# Patient Record
Sex: FEMALE | Race: BLACK OR AFRICAN AMERICAN | NOT HISPANIC OR LATINO | URBAN - METROPOLITAN AREA
[De-identification: names, ages, dates, MRNs, and addresses within clinical notes are randomized per-mention and may not be internally consistent; named-entity substitution may affect disease eponyms.]

---

## 2018-01-01 ENCOUNTER — EMERGENCY (EMERGENCY)
Facility: HOSPITAL | Age: 58
LOS: 1 days | Discharge: HOME | End: 2018-01-01
Attending: EMERGENCY MEDICINE | Admitting: EMERGENCY MEDICINE
Payer: COMMERCIAL

## 2018-01-01 VITALS — DIASTOLIC BLOOD PRESSURE: 67 MMHG | SYSTOLIC BLOOD PRESSURE: 106 MMHG

## 2018-01-01 VITALS
TEMPERATURE: 100 F | OXYGEN SATURATION: 98 % | HEART RATE: 82 BPM | DIASTOLIC BLOOD PRESSURE: 73 MMHG | SYSTOLIC BLOOD PRESSURE: 107 MMHG | RESPIRATION RATE: 16 BRPM

## 2018-01-01 LAB
ALBUMIN SERPL ELPH-MCNC: 3.4 G/DL — SIGNIFICANT CHANGE UP (ref 3.3–5)
ALP SERPL-CCNC: 51 U/L — SIGNIFICANT CHANGE UP (ref 40–120)
ALT FLD-CCNC: 58 U/L — HIGH (ref 4–33)
APPEARANCE UR: SIGNIFICANT CHANGE UP
AST SERPL-CCNC: 65 U/L — HIGH (ref 4–32)
BASE EXCESS BLDV CALC-SCNC: -1.6 MMOL/L — SIGNIFICANT CHANGE UP
BASOPHILS # BLD AUTO: 0.02 K/UL — SIGNIFICANT CHANGE UP (ref 0–0.2)
BASOPHILS NFR BLD AUTO: 0.3 % — SIGNIFICANT CHANGE UP (ref 0–2)
BILIRUB SERPL-MCNC: < 0.2 MG/DL — LOW (ref 0.2–1.2)
BILIRUB UR-MCNC: NEGATIVE — SIGNIFICANT CHANGE UP
BLOOD GAS VENOUS - CREATININE: 0.93 MG/DL — SIGNIFICANT CHANGE UP (ref 0.5–1.3)
BLOOD UR QL VISUAL: NEGATIVE — SIGNIFICANT CHANGE UP
BUN SERPL-MCNC: 12 MG/DL — SIGNIFICANT CHANGE UP (ref 7–23)
CALCIUM SERPL-MCNC: 8.2 MG/DL — LOW (ref 8.4–10.5)
CHLORIDE BLDV-SCNC: 106 MMOL/L — SIGNIFICANT CHANGE UP (ref 96–108)
CHLORIDE SERPL-SCNC: 103 MMOL/L — SIGNIFICANT CHANGE UP (ref 98–107)
CO2 SERPL-SCNC: 19 MMOL/L — LOW (ref 22–31)
COLOR SPEC: YELLOW — SIGNIFICANT CHANGE UP
CREAT SERPL-MCNC: 0.9 MG/DL — SIGNIFICANT CHANGE UP (ref 0.5–1.3)
EOSINOPHIL # BLD AUTO: 0.02 K/UL — SIGNIFICANT CHANGE UP (ref 0–0.5)
EOSINOPHIL NFR BLD AUTO: 0.3 % — SIGNIFICANT CHANGE UP (ref 0–6)
GAS PNL BLDV: 135 MMOL/L — LOW (ref 136–146)
GLUCOSE BLDV-MCNC: 142 — HIGH (ref 70–99)
GLUCOSE SERPL-MCNC: 133 MG/DL — HIGH (ref 70–99)
GLUCOSE UR-MCNC: NEGATIVE — SIGNIFICANT CHANGE UP
HCG SERPL-ACNC: < 5 MIU/ML — SIGNIFICANT CHANGE UP
HCO3 BLDV-SCNC: 23 MMOL/L — SIGNIFICANT CHANGE UP (ref 20–27)
HCT VFR BLD CALC: 42.2 % — SIGNIFICANT CHANGE UP (ref 34.5–45)
HCT VFR BLDV CALC: 42.9 % — SIGNIFICANT CHANGE UP (ref 34.5–45)
HGB BLD-MCNC: 14.2 G/DL — SIGNIFICANT CHANGE UP (ref 11.5–15.5)
HGB BLDV-MCNC: 14 G/DL — SIGNIFICANT CHANGE UP (ref 11.5–15.5)
HYALINE CASTS # UR AUTO: SIGNIFICANT CHANGE UP (ref 0–?)
IMM GRANULOCYTES # BLD AUTO: 0.02 # — SIGNIFICANT CHANGE UP
IMM GRANULOCYTES NFR BLD AUTO: 0.3 % — SIGNIFICANT CHANGE UP (ref 0–1.5)
KETONES UR-MCNC: NEGATIVE — SIGNIFICANT CHANGE UP
LACTATE BLDV-MCNC: 1.1 MMOL/L — SIGNIFICANT CHANGE UP (ref 0.5–2)
LEUKOCYTE ESTERASE UR-ACNC: NEGATIVE — SIGNIFICANT CHANGE UP
LYMPHOCYTES # BLD AUTO: 1.51 K/UL — SIGNIFICANT CHANGE UP (ref 1–3.3)
LYMPHOCYTES # BLD AUTO: 24.7 % — SIGNIFICANT CHANGE UP (ref 13–44)
MCHC RBC-ENTMCNC: 30.4 PG — SIGNIFICANT CHANGE UP (ref 27–34)
MCHC RBC-ENTMCNC: 33.6 % — SIGNIFICANT CHANGE UP (ref 32–36)
MCV RBC AUTO: 90.4 FL — SIGNIFICANT CHANGE UP (ref 80–100)
MONOCYTES # BLD AUTO: 0.69 K/UL — SIGNIFICANT CHANGE UP (ref 0–0.9)
MONOCYTES NFR BLD AUTO: 11.3 % — SIGNIFICANT CHANGE UP (ref 2–14)
MUCOUS THREADS # UR AUTO: SIGNIFICANT CHANGE UP
NEUTROPHILS # BLD AUTO: 3.86 K/UL — SIGNIFICANT CHANGE UP (ref 1.8–7.4)
NEUTROPHILS NFR BLD AUTO: 63.1 % — SIGNIFICANT CHANGE UP (ref 43–77)
NITRITE UR-MCNC: NEGATIVE — SIGNIFICANT CHANGE UP
NRBC # FLD: 0 — SIGNIFICANT CHANGE UP
PCO2 BLDV: 40 MMHG — LOW (ref 41–51)
PH BLDV: 7.38 PH — SIGNIFICANT CHANGE UP (ref 7.32–7.43)
PH UR: 6 — SIGNIFICANT CHANGE UP (ref 4.6–8)
PLATELET # BLD AUTO: 173 K/UL — SIGNIFICANT CHANGE UP (ref 150–400)
PMV BLD: 10.8 FL — SIGNIFICANT CHANGE UP (ref 7–13)
PO2 BLDV: 72 MMHG — HIGH (ref 35–40)
POTASSIUM BLDV-SCNC: 3.9 MMOL/L — SIGNIFICANT CHANGE UP (ref 3.4–4.5)
POTASSIUM SERPL-MCNC: 4.2 MMOL/L — SIGNIFICANT CHANGE UP (ref 3.5–5.3)
POTASSIUM SERPL-SCNC: 4.2 MMOL/L — SIGNIFICANT CHANGE UP (ref 3.5–5.3)
PROT SERPL-MCNC: 6.8 G/DL — SIGNIFICANT CHANGE UP (ref 6–8.3)
PROT UR-MCNC: 30 MG/DL — HIGH
RBC # BLD: 4.67 M/UL — SIGNIFICANT CHANGE UP (ref 3.8–5.2)
RBC # FLD: 13.4 % — SIGNIFICANT CHANGE UP (ref 10.3–14.5)
RBC CASTS # UR COMP ASSIST: SIGNIFICANT CHANGE UP (ref 0–?)
SAO2 % BLDV: 94.4 % — HIGH (ref 60–85)
SODIUM SERPL-SCNC: 136 MMOL/L — SIGNIFICANT CHANGE UP (ref 135–145)
SP GR SPEC: 1.03 — SIGNIFICANT CHANGE UP (ref 1–1.04)
SQUAMOUS # UR AUTO: SIGNIFICANT CHANGE UP
UROBILINOGEN FLD QL: NORMAL MG/DL — SIGNIFICANT CHANGE UP
WBC # BLD: 6.12 K/UL — SIGNIFICANT CHANGE UP (ref 3.8–10.5)
WBC # FLD AUTO: 6.12 K/UL — SIGNIFICANT CHANGE UP (ref 3.8–10.5)
WBC CLUMPS #/AREA URNS HPF: PRESENT — HIGH (ref 0–?)
WBC UR QL: SIGNIFICANT CHANGE UP (ref 0–?)

## 2018-01-01 PROCEDURE — 71046 X-RAY EXAM CHEST 2 VIEWS: CPT | Mod: 26

## 2018-01-01 PROCEDURE — 93010 ELECTROCARDIOGRAM REPORT: CPT

## 2018-01-01 PROCEDURE — 99285 EMERGENCY DEPT VISIT HI MDM: CPT | Mod: 25

## 2018-01-01 RX ORDER — KETOROLAC TROMETHAMINE 30 MG/ML
15 SYRINGE (ML) INJECTION ONCE
Qty: 0 | Refills: 0 | Status: DISCONTINUED | OUTPATIENT
Start: 2018-01-01 | End: 2018-01-01

## 2018-01-01 RX ORDER — SODIUM CHLORIDE 9 MG/ML
2000 INJECTION INTRAMUSCULAR; INTRAVENOUS; SUBCUTANEOUS ONCE
Qty: 0 | Refills: 0 | Status: COMPLETED | OUTPATIENT
Start: 2018-01-01 | End: 2018-01-01

## 2018-01-01 RX ORDER — ONDANSETRON 8 MG/1
4 TABLET, FILM COATED ORAL ONCE
Qty: 0 | Refills: 0 | Status: COMPLETED | OUTPATIENT
Start: 2018-01-01 | End: 2018-01-01

## 2018-01-01 RX ORDER — ACETAMINOPHEN 500 MG
1000 TABLET ORAL ONCE
Qty: 0 | Refills: 0 | Status: COMPLETED | OUTPATIENT
Start: 2018-01-01 | End: 2018-01-01

## 2018-01-01 RX ADMIN — ONDANSETRON 4 MILLIGRAM(S): 8 TABLET, FILM COATED ORAL at 01:54

## 2018-01-01 RX ADMIN — SODIUM CHLORIDE 1333.33 MILLILITER(S): 9 INJECTION INTRAMUSCULAR; INTRAVENOUS; SUBCUTANEOUS at 01:54

## 2018-01-01 RX ADMIN — Medication 15 MILLIGRAM(S): at 06:08

## 2018-01-01 RX ADMIN — Medication 400 MILLIGRAM(S): at 01:54

## 2018-01-01 RX ADMIN — SODIUM CHLORIDE 1000 MILLILITER(S): 9 INJECTION INTRAMUSCULAR; INTRAVENOUS; SUBCUTANEOUS at 04:11

## 2018-01-01 RX ADMIN — Medication 1000 MILLIGRAM(S): at 02:09

## 2018-01-01 RX ADMIN — Medication 15 MILLIGRAM(S): at 06:25

## 2018-01-01 NOTE — ED PROVIDER NOTE - OBJECTIVE STATEMENT
Oskar: 57 F, passed out in bathroom, grazed her head on the toilet. No confusion before or after. No Sz. activity. Wasn't feeling well (fever, chills, flu Sx, diarrhea, cough, sore throat). Poor appetite recently.

## 2018-01-01 NOTE — ED PROVIDER NOTE - ATTENDING CONTRIBUTION TO CARE
I performed a face-to-face evaluation of the patient and performed a history and physical examination. I agree with the history and physical examination.    57 F, viral syndrome, syncope after being on toilet. No injury. Appears mildly ill. SBP 95. PE o/w unremarkable. Lungs CTA. No M/R/G. No LE edema. Check labs. Give IVF.

## 2018-01-01 NOTE — ED ADULT NURSE NOTE - OBJECTIVE STATEMENT
pt. received in room 7 , A&Ox4 c/o syncopal episode at home around 11pm. Pt. states she was in the bathroom , and  found her on the floor, stating she was not passed out for more than 5 mins at bedside. As per pt. NO PMH , Pt. states for the past 3 days she has been feeling weak and c/o diarrhea and low appetite  . Pt. Vitals as noted, and in no acute distress. awaiting UA from pt. MD at bedside , fs 134.

## 2018-01-01 NOTE — ED PROVIDER NOTE - CONSTITUTIONAL, MLM
normal... Mildly-ill appearing, well nourished, awake, alert, oriented to person, place, time/situation and in no apparent distress.

## 2018-01-01 NOTE — ED PROVIDER NOTE - DISPOSITION TYPE
Visit Information Date & Time Provider Department Dept. Phone Encounter #  
 10/17/2017  8:30 AM Harshil Saldana MD [address] [place] [address] [phone] [id number] Follow-up Instructions Return in about 3 months (around 1/17/2018), or if symptoms worsen or fail to improve. Upcoming Health Maintenance Date Due Pneumococcal 19-64 Medium Risk (1 of 1 - PPSV23) 8/10/1980 FOBT Q 1 YEAR AGE 50-75 10/23/2016 PAP AKA CERVICAL CYTOLOGY 4/15/2017 EYE EXAM RETINAL OR DILATED Q1 12/7/2017 HEMOGLOBIN A1C Q6M 1/17/2018 BREAST CANCER SCRN MAMMOGRAM 2/26/2018 FOOT EXAM Q1 4/14/2018 MICROALBUMIN Q1 7/17/2018 LIPID PANEL Q1 7/17/2018 DTaP/Tdap/Td series (2 - Td) 2/27/2025 Allergies as of 10/17/2017  Review Complete On: 10/17/2017 By: Harshil Saldana MD  
  
 Severity Noted Reaction Type Reactions Sulfur High 03/10/2016    Hives Amoxicillin  04/19/2013    Nausea Only Other Medication  04/25/2011    Unknown (comments) Renu Minaya Current Immunizations  Reviewed on 8/8/2017 Name Date  
 TB Skin Test (PPD) Intradermal 8/8/2017 Tdap 2/27/2015 Not reviewed this visit You Were Diagnosed With   
  
 Codes Comments Type 2 diabetes mellitus without complication, with long-term current use of insulin (HCC)    -  Primary ICD-10-CM: E11.9, Z79.4 ICD-9-CM: 250.00, V58.67 Essential hypertension     ICD-10-CM: I10 
ICD-9-CM: 401.9 Hypercholesteremia     ICD-10-CM: E78.00 ICD-9-CM: 272.0 Gastroesophageal reflux disease without esophagitis     ICD-10-CM: K21.9 ICD-9-CM: 530.81 Gastroesophageal reflux disease with esophagitis     ICD-10-CM: K21.0 ICD-9-CM: 530.11 Vitals BP Pulse Temp Resp Height(growth percentile) Weight(growth percentile) 140/70 (BP 1 Location: Right arm, BP Patient Position: Sitting) 89 97.9 °F (36.6 °C) (Oral) 20 5' 2\" (1.575 m) 180 lb (81.6 kg) LMP SpO2 BMI OB Status Smoking Status 10/07/2012 100% 32.92 kg/m2 Postmenopausal Former Smoker BMI and BSA Data Body Mass Index Body Surface Area  
 32.92 kg/m 2 1.89 m 2 Preferred Pharmacy Pharmacy Name Phone [id number]- [place] - [address] [phone] Your Updated Medication List  
  
   
This list is accurate as of: 10/17/17  8:38 AM.  Always use your most recent med list. amLODIPine 5 mg tablet Commonly known as:  Parvin Spruce TAKE 1 TABLET BY MOUTH EVERY DAY  
  
 atorvastatin 40 mg tablet Commonly known as:  LIPITOR  
TAKE 1 TABLET BY MOUTH DAILY. Blood-Glucose Meter monitoring kit CONTOUR METER; Use as directed * glucose blood VI test strips strip Commonly known as:  blood glucose test  
As directed * glucose blood VI test strips strip Commonly known as:  Ascensia CONTOUR Daily  
  
 insulin glargine 300 unit/mL (1.5 mL) Inpn Commonly known as:  TOUJEO SOLOSTAR  
40 Units by SubCUTAneous route daily. Insulin Needles (Disposable) 32 gauge x 1/4\" Ndle Commonly known as:  NOVOFINE 32 USE AS DIRECTED  
  
 losartan 100 mg tablet Commonly known as:  COZAAR  
TAKE 1 TABLET EVERY DAY  
  
 omeprazole 40 mg capsule Commonly known as:  PriLOSEC Take 1 Cap by mouth daily. * Notice: This list has 2 medication(s) that are the same as other medications prescribed for you. Read the directions carefully, and ask your doctor or other care provider to review them with you. Prescriptions Sent to Pharmacy Refills  
 omeprazole (PRILOSEC) 40 mg capsule 12 Sig: Take 1 Cap by mouth daily. Class: Normal  
 Pharmacy: [facility]/pharmacy [id number] Ph #: [phone] Route: Oral  
  
We Performed the Following AMB POC GLUCOSE BLOOD, BY GLUCOSE MONITORING DEVICE [26285 CPT(R)] AMB POC HEMOGLOBIN A1C [08646 CPT(R)] AMB POC LIPID PROFILE [58211 CPT(R)] Follow-up Instructions Return in about 3 months (around 2018), or if symptoms worsen or fail to improve. Patient Instructions [employer] Activation Thank you for requesting access to seedchange. Please follow the instructions below to securely access and download your online medical record. seedchange allows you to send messages to your doctor, view your test results, renew your prescriptions, schedule appointments, and more. How Do I Sign Up? 1. In your internet browser, go to [email or web address] 
2. Click on the First Time User? Click Here link in the Sign In box. You will be redirect to the New Member Sign Up page. 3. Enter your seedchange Access Code exactly as it appears below. You will not need to use this code after youve completed the sign-up process. If you do not sign up before the expiration date, you must request a new code. seedchange Access Code: Activation code not generated Current seedchange Status: Patient Declined (This is the date your seedchange access code will ) 4. Enter the last four digits of your Social Security Number (xxxx) and Date of Birth (mm/dd/yyyy) as indicated and click Submit. You will be taken to the next sign-up page. 5. Create a seedchange ID. This will be your seedchange login ID and cannot be changed, so think of one that is secure and easy to remember. 6. Create a seedchange password. You can change your password at any time. 7. Enter your Password Reset Question and Answer. This can be used at a later time if you forget your password. 8. Enter your e-mail address. You will receive e-mail notification when new information is available in [address] 9. Click Sign Up. You can now view and download portions of your medical record. 10. Click the Download Summary menu link to download a portable copy of your medical information. Additional Information If you have questions, please visit the Frequently Asked Questions section of the [employer] website at [email or web address] Remember, [employer] is NOT to be used for urgent needs. For medical emergencies, dial 911. Please provide this summary of care documentation to your next provider. Your primary care clinician is listed as Ondina Galvan. If you have any questions after today's visit, please call [phone]. DISCHARGE

## 2018-01-01 NOTE — ED ADULT TRIAGE NOTE - CHIEF COMPLAINT QUOTE
Pt arrives to ED via EMS s/p witnessed syncope while on toilet.  Family denies head trauma with LOC "less than 5 minutes." EMS placed 18g to LAC and gave fluids.  fs on site was 121.   Pt reports not feeling well for 2 days and ate only soup today staying in bed.  EKG in field and in triage.

## 2021-06-10 ENCOUNTER — INPATIENT (INPATIENT)
Facility: HOSPITAL | Age: 61
LOS: 2 days | Discharge: HOME | End: 2021-06-13
Attending: INTERNAL MEDICINE | Admitting: INTERNAL MEDICINE
Payer: COMMERCIAL

## 2021-06-10 VITALS — HEART RATE: 44 BPM | DIASTOLIC BLOOD PRESSURE: 45 MMHG | SYSTOLIC BLOOD PRESSURE: 77 MMHG

## 2021-06-10 DIAGNOSIS — R55 SYNCOPE AND COLLAPSE: ICD-10-CM

## 2021-06-10 DIAGNOSIS — R00.1 BRADYCARDIA, UNSPECIFIED: ICD-10-CM

## 2021-06-10 DIAGNOSIS — E78.5 HYPERLIPIDEMIA, UNSPECIFIED: ICD-10-CM

## 2021-06-10 DIAGNOSIS — Z29.9 ENCOUNTER FOR PROPHYLACTIC MEASURES, UNSPECIFIED: ICD-10-CM

## 2021-06-10 DIAGNOSIS — R07.9 CHEST PAIN, UNSPECIFIED: ICD-10-CM

## 2021-06-10 LAB
ALBUMIN SERPL ELPH-MCNC: 3.6 G/DL — SIGNIFICANT CHANGE UP (ref 3.3–5)
ALP SERPL-CCNC: 80 U/L — SIGNIFICANT CHANGE UP (ref 40–120)
ALT FLD-CCNC: 26 U/L — SIGNIFICANT CHANGE UP (ref 4–33)
ANION GAP SERPL CALC-SCNC: 11 MMOL/L — SIGNIFICANT CHANGE UP (ref 7–14)
APTT BLD: 24.2 SEC — LOW (ref 27–36.3)
AST SERPL-CCNC: 25 U/L — SIGNIFICANT CHANGE UP (ref 4–32)
BASOPHILS # BLD AUTO: 0.03 K/UL — SIGNIFICANT CHANGE UP (ref 0–0.2)
BASOPHILS NFR BLD AUTO: 0.2 % — SIGNIFICANT CHANGE UP (ref 0–2)
BILIRUB SERPL-MCNC: 0.3 MG/DL — SIGNIFICANT CHANGE UP (ref 0.2–1.2)
BLOOD GAS VENOUS COMPREHENSIVE RESULT: SIGNIFICANT CHANGE UP
BUN SERPL-MCNC: 12 MG/DL — SIGNIFICANT CHANGE UP (ref 7–23)
CALCIUM SERPL-MCNC: 8.5 MG/DL — SIGNIFICANT CHANGE UP (ref 8.4–10.5)
CHLORIDE SERPL-SCNC: 106 MMOL/L — SIGNIFICANT CHANGE UP (ref 98–107)
CO2 SERPL-SCNC: 23 MMOL/L — SIGNIFICANT CHANGE UP (ref 22–31)
CREAT SERPL-MCNC: 0.93 MG/DL — SIGNIFICANT CHANGE UP (ref 0.5–1.3)
EOSINOPHIL # BLD AUTO: 0.39 K/UL — SIGNIFICANT CHANGE UP (ref 0–0.5)
EOSINOPHIL NFR BLD AUTO: 3 % — SIGNIFICANT CHANGE UP (ref 0–6)
GLUCOSE SERPL-MCNC: 164 MG/DL — HIGH (ref 70–99)
HCT VFR BLD CALC: 38 % — SIGNIFICANT CHANGE UP (ref 34.5–45)
HGB BLD-MCNC: 12.4 G/DL — SIGNIFICANT CHANGE UP (ref 11.5–15.5)
IANC: 6.22 K/UL — SIGNIFICANT CHANGE UP (ref 1.5–8.5)
IMM GRANULOCYTES NFR BLD AUTO: 0.2 % — SIGNIFICANT CHANGE UP (ref 0–1.5)
INR BLD: 0.99 RATIO — SIGNIFICANT CHANGE UP (ref 0.88–1.16)
LYMPHOCYTES # BLD AUTO: 42.7 % — SIGNIFICANT CHANGE UP (ref 13–44)
LYMPHOCYTES # BLD AUTO: 5.46 K/UL — HIGH (ref 1–3.3)
MAGNESIUM SERPL-MCNC: 1.9 MG/DL — SIGNIFICANT CHANGE UP (ref 1.6–2.6)
MCHC RBC-ENTMCNC: 29.2 PG — SIGNIFICANT CHANGE UP (ref 27–34)
MCHC RBC-ENTMCNC: 32.6 GM/DL — SIGNIFICANT CHANGE UP (ref 32–36)
MCV RBC AUTO: 89.6 FL — SIGNIFICANT CHANGE UP (ref 80–100)
MONOCYTES # BLD AUTO: 0.67 K/UL — SIGNIFICANT CHANGE UP (ref 0–0.9)
MONOCYTES NFR BLD AUTO: 5.2 % — SIGNIFICANT CHANGE UP (ref 2–14)
NEUTROPHILS # BLD AUTO: 6.22 K/UL — SIGNIFICANT CHANGE UP (ref 1.8–7.4)
NEUTROPHILS NFR BLD AUTO: 48.7 % — SIGNIFICANT CHANGE UP (ref 43–77)
NRBC # BLD: 0 /100 WBCS — SIGNIFICANT CHANGE UP
NRBC # FLD: 0 K/UL — SIGNIFICANT CHANGE UP
PHOSPHATE SERPL-MCNC: 2.8 MG/DL — SIGNIFICANT CHANGE UP (ref 2.5–4.5)
PLATELET # BLD AUTO: 277 K/UL — SIGNIFICANT CHANGE UP (ref 150–400)
POTASSIUM SERPL-MCNC: 3.4 MMOL/L — LOW (ref 3.5–5.3)
POTASSIUM SERPL-SCNC: 3.4 MMOL/L — LOW (ref 3.5–5.3)
PROT SERPL-MCNC: 6.7 G/DL — SIGNIFICANT CHANGE UP (ref 6–8.3)
PROTHROM AB SERPL-ACNC: 11.3 SEC — SIGNIFICANT CHANGE UP (ref 10.6–13.6)
RBC # BLD: 4.24 M/UL — SIGNIFICANT CHANGE UP (ref 3.8–5.2)
RBC # FLD: 13.9 % — SIGNIFICANT CHANGE UP (ref 10.3–14.5)
SARS-COV-2 RNA SPEC QL NAA+PROBE: SIGNIFICANT CHANGE UP
SODIUM SERPL-SCNC: 140 MMOL/L — SIGNIFICANT CHANGE UP (ref 135–145)
TOXICOLOGY SCREEN, DRUGS OF ABUSE, SERUM RESULT: SIGNIFICANT CHANGE UP
TROPONIN T, HIGH SENSITIVITY RESULT: 7 NG/L — SIGNIFICANT CHANGE UP
TROPONIN T, HIGH SENSITIVITY RESULT: 8 NG/L — SIGNIFICANT CHANGE UP
TSH SERPL-MCNC: 1.74 UIU/ML — SIGNIFICANT CHANGE UP (ref 0.27–4.2)
WBC # BLD: 12.8 K/UL — HIGH (ref 3.8–10.5)
WBC # FLD AUTO: 12.8 K/UL — HIGH (ref 3.8–10.5)

## 2021-06-10 PROCEDURE — 71275 CT ANGIOGRAPHY CHEST: CPT | Mod: 26

## 2021-06-10 PROCEDURE — 74174 CTA ABD&PLVS W/CONTRAST: CPT | Mod: 26

## 2021-06-10 PROCEDURE — 71045 X-RAY EXAM CHEST 1 VIEW: CPT | Mod: 26

## 2021-06-10 PROCEDURE — 99223 1ST HOSP IP/OBS HIGH 75: CPT

## 2021-06-10 PROCEDURE — 93306 TTE W/DOPPLER COMPLETE: CPT | Mod: 26

## 2021-06-10 PROCEDURE — 99291 CRITICAL CARE FIRST HOUR: CPT | Mod: 25

## 2021-06-10 PROCEDURE — 93010 ELECTROCARDIOGRAM REPORT: CPT

## 2021-06-10 RX ORDER — FENTANYL CITRATE 50 UG/ML
50 INJECTION INTRAVENOUS ONCE
Refills: 0 | Status: DISCONTINUED | OUTPATIENT
Start: 2021-06-10 | End: 2021-06-10

## 2021-06-10 RX ORDER — SODIUM CHLORIDE 9 MG/ML
2000 INJECTION INTRAMUSCULAR; INTRAVENOUS; SUBCUTANEOUS ONCE
Refills: 0 | Status: COMPLETED | OUTPATIENT
Start: 2021-06-10 | End: 2021-06-10

## 2021-06-10 RX ORDER — ASPIRIN/CALCIUM CARB/MAGNESIUM 324 MG
162 TABLET ORAL ONCE
Refills: 0 | Status: COMPLETED | OUTPATIENT
Start: 2021-06-10 | End: 2021-06-10

## 2021-06-10 RX ORDER — ATROPINE SULFATE 0.1 MG/ML
0.5 SYRINGE (ML) INJECTION ONCE
Refills: 0 | Status: COMPLETED | OUTPATIENT
Start: 2021-06-10 | End: 2021-06-10

## 2021-06-10 RX ORDER — ACETAMINOPHEN 500 MG
650 TABLET ORAL EVERY 6 HOURS
Refills: 0 | Status: DISCONTINUED | OUTPATIENT
Start: 2021-06-10 | End: 2021-06-13

## 2021-06-10 RX ORDER — ENOXAPARIN SODIUM 100 MG/ML
40 INJECTION SUBCUTANEOUS DAILY
Refills: 0 | Status: DISCONTINUED | OUTPATIENT
Start: 2021-06-10 | End: 2021-06-13

## 2021-06-10 RX ORDER — POTASSIUM CHLORIDE 20 MEQ
40 PACKET (EA) ORAL ONCE
Refills: 0 | Status: COMPLETED | OUTPATIENT
Start: 2021-06-10 | End: 2021-06-10

## 2021-06-10 RX ADMIN — Medication 650 MILLIGRAM(S): at 21:09

## 2021-06-10 RX ADMIN — SODIUM CHLORIDE 2000 MILLILITER(S): 9 INJECTION INTRAMUSCULAR; INTRAVENOUS; SUBCUTANEOUS at 06:27

## 2021-06-10 RX ADMIN — FENTANYL CITRATE 50 MICROGRAM(S): 50 INJECTION INTRAVENOUS at 07:22

## 2021-06-10 RX ADMIN — Medication 40 MILLIEQUIVALENT(S): at 09:32

## 2021-06-10 RX ADMIN — Medication 162 MILLIGRAM(S): at 08:57

## 2021-06-10 RX ADMIN — Medication 0.5 MILLIGRAM(S): at 06:11

## 2021-06-10 RX ADMIN — Medication 650 MILLIGRAM(S): at 22:09

## 2021-06-10 RX ADMIN — ENOXAPARIN SODIUM 40 MILLIGRAM(S): 100 INJECTION SUBCUTANEOUS at 21:09

## 2021-06-10 NOTE — CONSULT NOTE ADULT - ASSESSMENT
Assessment and Plan    61 y/o F with no PMH presents with chest tightness. Patient states that she was in her usual state of health until yesterday.    EKG: NSR no STT chnages  Tele: NSR 70s    1. Chest pain  -trops 8--7  -CTA negative for PE or AAA  -will get echo    2. Syncope  -after nitro while with EMS  -bradycardic and hypotensive in ER, improved s/p atropine and IVF now in NSR 70s  -continue to monitor on tele  -echo pending    3. DVT prophylaxis  -hep subq   Assessment and Plan    59 y/o F with no PMH presents with chest tightness. Patient states that she was in her usual state of health until yesterday.    EKG: NSR no STT chnages  Tele: NSR 70s    1. Chest pain  -trops 8--7  -CTA negative for PE or AAA  -as per patient had echo done yesterday at Dr. Ray office ([phone]) will try to obtain results    2. Syncope  -after nitro while with EMS  -bradycardic and hypotensive in ER, improved s/p atropine and IVF now in NSR 70s  -continue to monitor on tele  -echo pending    3. DVT prophylaxis  -hep subq

## 2021-06-10 NOTE — H&P ADULT - PROBLEM SELECTOR PLAN 3
-likely s/p nitro administration  -resolved s/p atropine in ED  -NSR now  -monitor on tele  -avoid AV leatha blocking agents

## 2021-06-10 NOTE — H&P ADULT - PROBLEM SELECTOR PLAN 2
-episode of witnessed syncope in ambulance after nitro administration  -suspect vasovagal episode  -Monitor on tele  -check TTE

## 2021-06-10 NOTE — ED ADULT TRIAGE NOTE - CHIEF COMPLAINT QUOTE
Pt brought in by EMS c/o chest pain. AS per EMS En route to ED pt received x1 nitro tab, had episode of unresponsiveness and bradycardiac to 30's. Charge RN aware pt taken to trauma C.

## 2021-06-10 NOTE — H&P ADULT - PROBLEM SELECTOR PLAN 4
-was told by PCP yesterday that cholesterol slightly elevated  -not started on meds, to be monitored as outpt. F/u scheduled in 3 months

## 2021-06-10 NOTE — ED ADULT NURSE REASSESSMENT NOTE - NS ED NURSE REASSESS COMMENT FT1
Report given to CDU RN . Patient is alert times four. Breathing is even and unlabored. Patient denies discomfort at this time. IV patent. Pending transport via stretcher. Safety maintained.
Report received, pt alert and awake, breathing even and unlabored, NSR on monitor. Pt reports mild chest pain but much better than upon arrival. Awaiting dispo, will monitor
Pt received in 3B area alertx4. Pt verbalizes pain relief at this time. Breathing even and unlabored. Denies chest discomfort. NSR on cardiac monitor. Pt updated on plan of care pending admission bed assignment.

## 2021-06-10 NOTE — ED PROVIDER NOTE - NS ED ROS FT
Chest pain radiating to back  weakness    otherwise limited history secondary to condition  See HPI for EMS story

## 2021-06-10 NOTE — H&P ADULT - HISTORY OF PRESENT ILLNESS
59 y/o F with no PMH presents with chest tightness. Patient states that she was in her usual state of health until yesterday. She went to her PCP for check up and everything was noted to be normal. This morning she woke up feeling uneasy. As morning progressed she felt chest tightness and overall unwell. Denies dizziness, has pleuritic chest pain, but no SOB, no palpitations. Denies any medical history. Does not smoke or use alcohol. No LE edema. Slight weight gain recently but nothing significant. In the ambulance she was given asa and nitro. After nitro patient passed out and does not remember what happened. In ED on arrival patient was maci and hypotensive. She received atropine and since then her HR and BP has improved. Currently she denies any concerns and is feeling better.     Vital Signs Last 24 Hrs  T(C): 37 (10 Kobi 2021 06:20), Max: 37 (10 Kobi 2021 06:20)  T(F): 98.6 (10 Kobi 2021 06:20), Max: 98.6 (10 Kobi 2021 06:20)  HR: 74 (10 Kobi 2021 07:37) (44 - 93)  BP: 104/73 (10 Kobi 2021 07:37) (77/45 - 115/74)  BP(mean): 75 (10 Kobi 2021 06:29) (73 - 75)  RR: 19 (10 Kobi 2021 07:37) (19 - 22)  SpO2: 100% (10 Kobi 2021 07:37) (95% - 100%)

## 2021-06-10 NOTE — H&P ADULT - NSICDXFAMILYHX_GEN_ALL_CORE_FT
FAMILY HISTORY:  Father  Still living? Unknown  FHx: diabetes mellitus, Age at diagnosis: Age Unknown    Mother  Still living? Unknown  FHx: diabetes mellitus, Age at diagnosis: Age Unknown

## 2021-06-10 NOTE — ED PROVIDER NOTE - OBJECTIVE STATEMENT
59 yo F with reportedly only HLD that is being brought in by EMS from home. Per EMS, pt called them complaining of chest pain radiating to back, when they showed up pt walked outside to meet them, while in ambulance she had chest pain, EKG was NSR without any ischemic changes per EMS so they gave her chewable ASA and one dose SL NGT and 5 mins after this pt told them she felt like 61 yo F with reportedly only HLD that is being brought in by EMS from home. Per EMS, pt called them complaining of chest pain radiating to back, when they showed up pt walked outside to meet them, while in ambulance she had chest pain, EKG was NSR without any ischemic changes per EMS so they gave her chewable ASA and one dose SL NGT and 5 mins after this pt told them she felt like she was going to pass out and pt then became diaphoretic and had a syncopal episode, had pulses entire time, they laid her flat and as they were about to place pacer pads on pt, she became minimally alert.   In ED, pt had hypotension and bradycardic but was awake and alert although in acute distress, saturating 1005 on RA and RR was WNL.

## 2021-06-10 NOTE — H&P ADULT - NSICDXPASTMEDICALHX_GEN_ALL_CORE_FT
PAST MEDICAL HISTORY:  Hyperlipidemia, unspecified hyperlipidemia type     No pertinent past medical history

## 2021-06-10 NOTE — ED PROVIDER NOTE - CLINICAL SUMMARY MEDICAL DECISION MAKING FREE TEXT BOX
61 yo F with reportedly only HLD that is being brought in by EMS from home for initial chest pain then had an unresponsive episode en route s/p NGT but was 5 mins after given dose. now alert. was given atropine upon arrival here and now HR in the 80s, Bp improved. Will require work up for AAA/dissection vs ACS vs PE. Will likely require hospitalization. Will closely monitor on cardiac monitor, placed on Zoll in case pt has another unresponsive episode.

## 2021-06-10 NOTE — ED PROVIDER NOTE - CRITICAL CARE ATTENDING CONTRIBUTION TO CARE
Upon my evaluation, this patient had a high probability of imminent or life-threatening deterioration due to bradycardia and an episode of unresponsiveness which required my direct attention, intervention, and personal management.  The patient has a  medical condition that impairs one or more vital organ systems.  Frequent personal assessment and adjustment of medical interventions was performed.      I have personally provided 33 minutes of critical care time exclusive of time spent on separately billable procedures. Time includes review of laboratory data, radiology results, discussion with consultants, patient and family; monitoring for potential decompensation, as well as time spent retrieving data and reviewing the chart and documenting the visit. Interventions were performed as documented above.

## 2021-06-10 NOTE — H&P ADULT - ASSESSMENT
61 y/o F no PMH admitted with chest tightness. Course complicated in ambulance after nitro with bradycardia. Bradycardia resolved in ED after atropine.

## 2021-06-10 NOTE — ED ADULT NURSE NOTE - OBJECTIVE STATEMENT
FACILITATOR RN: Notification received to [facility]. Pt is a 60yr old female, A&OX4 and ambulatory at baseline, no PMH, c/o of midsternal chest pain that began in the early morning. Pt was given 324mg of aspirin and 0.4 sublingual nitro en route with EMS and became bradycardic and unresponsive for "about a minute or so" after medications as per EMS. Pt then became responsive however generally weak and lethargic after episode. Pt found to be bradycardic to the 40's in the ER- medications given as per order. Now NSR on the CM. Pt responsive to verbal and tactile stimuli at this time. Resp. even and unlabored. Zoll pads placed. 20g IV placed to the R AC and 18g IV placed to the L AC from EMS. Labs sent. Denies SOB, HA, dizziness, abd. pain, N/V, fever/chills, cough, and urinary symptoms at this time. VS as noted. MD Mcgarry at bedside. Report given to primary RN Briana.

## 2021-06-10 NOTE — CONSULT NOTE ADULT - SUBJECTIVE AND OBJECTIVE BOX
Jose Little MD  Interventional Cardiology / Endovascular Specialist  [place] Office : [address] [place] [address] [place] [address]  Tel:   [place] Office : [address] [facility] [place] [address]  Tel: [phone]  Cell : [phone]    HISTORY OF PRESENTING ILLNESS:  61 y/o F with no PMH presents with chest tightness. Patient states that she was in her usual state of health until yesterday. She went to her PCP for check up and everything was noted to be normal. This morning she woke up feeling uneasy. As morning progressed she felt chest tightness and overall unwell. Denies dizziness, has pleuritic chest pain, but no SOB, no palpitations. Denies any medical history. Does not smoke or use alcohol. No LE edema. Slight weight gain recently but nothing significant. In the ambulance she was given asa and nitro. After nitro patient passed out and does not remember what happened. In ED on arrival patient was maci and hypotensive. She received atropine and since then her HR and BP has improved. Currently she denies any concerns and is feeling better. Denies any cardiac history. States had a echocardiogram performed yesterday at her PCPs office.   	  MEDICATIONS:  enoxaparin Injectable 40 milliGRAM(s) SubCutaneous daily        acetaminophen   Tablet .. 650 milliGRAM(s) Oral every 6 hours PRN            PAST MEDICAL/SURGICAL HISTORY  PAST MEDICAL & SURGICAL HISTORY:  No pertinent past medical history    Hyperlipidemia, unspecified hyperlipidemia type    No significant past surgical history        SOCIAL HISTORY: Substance Use (street drugs): ( x ) never used  (  ) other:    FAMILY HISTORY:  FHx: diabetes mellitus (Father, Mother)        REVIEW OF SYSTEMS:  CONSTITUTIONAL: No fever, weight loss, or fatigue  EYES: No eye pain, visual disturbances, or discharge  ENMT:  No difficulty hearing, tinnitus, vertigo; No sinus or throat pain  BREASTS: No pain, masses, or nipple discharge  GASTROINTESTINAL: No abdominal or epigastric pain. No nausea, vomiting, or hematemesis; No diarrhea or constipation. No melena or hematochezia.  GENITOURINARY: No dysuria, frequency, hematuria, or incontinence  NEUROLOGICAL: No headaches, memory loss, loss of strength, numbness, or tremors  ENDOCRINE: No heat or cold intolerance; No hair loss  MUSCULOSKELETAL: No joint pain or swelling; No muscle, back, or extremity pain  PSYCHIATRIC: No depression, anxiety, mood swings, or difficulty sleeping  HEME/LYMPH: No easy bruising, or bleeding gums  All others negative    PHYSICAL EXAM:  T(C): 37.1 (06-10-21 @ 11:35), Max: 37.1 (06-10-21 @ 11:35)  HR: 74 (06-10-21 @ 11:35) (44 - 93)  BP: 113/76 (06-10-21 @ 11:35) (77/45 - 115/74)  RR: 20 (06-10-21 @ 11:35) (19 - 22)  SpO2: 100% (06-10-21 @ 11:35) (95% - 100%)  Wt(kg): --  I&O's Summary        GENERAL: NAD  EYES: EOMI, PERRLA, conjunctiva and sclera clear  ENMT: No tonsillar erythema, exudates, or enlargement  Cardiovascular: Normal S1 S2, No JVD, No murmurs, No edema  Respiratory: Lungs clear to auscultation	  Gastrointestinal:  Soft, Non-tender, + BS	  Extremities: No edema  NERVOUS SYSTEM:  Alert & Oriented X3                                    12.4   12.80 )-----------( 277      ( 10 Kobi 2021 07:05 )             38.0     06-10    140  |  106  |  12  ----------------------------<  164<H>  3.4<L>   |  23  |  0.93    Ca    8.5      10 Kobi 2021 06:40  Phos  2.8     06-10  Mg     1.9     06-10    TPro  6.7  /  Alb  3.6  /  TBili  0.3  /  DBili  x   /  AST  25  /  ALT  26  /  AlkPhos  80  06-10    proBNP:   Lipid Profile:   HgA1c:   TSH: Thyroid Stimulating Hormone, Serum: 0.78 uIU/mL (06-10 @ 09:11)  Thyroid Stimulating Hormone, Serum: 1.74 uIU/mL (06-10 @ 06:40)      Consultant(s) Notes Reviewed:  [x ] YES  [ ] NO    Care Discussed with Consultants/Other Providers [ x] YES  [ ] NO    Imaging Personally Reviewed independently:  [x] YES  [ ] NO    All labs, radiologic studies, vitals, orders and medications list reviewed. Patient is seen and examined at bedside. Case discussed with medical team.

## 2021-06-10 NOTE — H&P ADULT - PROBLEM SELECTOR PLAN 1
-Chest tightness x 1 day  -Trops negative x2  -EKG non ischemic  -Check TTE  -Cards to follow as primary team  -Monitor on tele  -S/p asa load

## 2021-06-10 NOTE — ED ADULT NURSE NOTE - NSIMPLEMENTINTERV_GEN_ALL_ED
Implemented All Fall Risk Interventions:  [place] to call system. Call bell, personal items and telephone within reach. Instruct patient to call for assistance. Room bathroom lighting operational. Non-slip footwear when patient is off stretcher. Physically safe environment: no spills, clutter or unnecessary equipment. Stretcher in lowest position, wheels locked, appropriate side rails in place. Provide visual cue, wrist band, yellow gown, etc. Monitor gait and stability. Monitor for mental status changes and reorient to person, place, and time. Review medications for side effects contributing to fall risk. Reinforce activity limits and safety measures with patient and family.

## 2021-06-10 NOTE — ED PROVIDER NOTE - CARE PLAN
Principal Discharge DX:	Bradycardia  Secondary Diagnosis:	Unresponsive episode  Secondary Diagnosis:	Chest pain, unspecified type

## 2021-06-11 LAB
A1C WITH ESTIMATED AVERAGE GLUCOSE RESULT: 6 % — HIGH (ref 4–5.6)
ANION GAP SERPL CALC-SCNC: 11 MMOL/L — SIGNIFICANT CHANGE UP (ref 7–14)
APPEARANCE UR: CLEAR — SIGNIFICANT CHANGE UP
BACTERIA # UR AUTO: ABNORMAL
BASE EXCESS BLDV CALC-SCNC: -0.4 MMOL/L — SIGNIFICANT CHANGE UP (ref -3–2)
BILIRUB UR-MCNC: NEGATIVE — SIGNIFICANT CHANGE UP
BLOOD GAS VENOUS - CREATININE: 0.9 MG/DL — SIGNIFICANT CHANGE UP (ref 0.5–1.3)
BLOOD GAS VENOUS COMPREHENSIVE RESULT: SIGNIFICANT CHANGE UP
BUN SERPL-MCNC: 11 MG/DL — SIGNIFICANT CHANGE UP (ref 7–23)
CALCIUM SERPL-MCNC: 8.7 MG/DL — SIGNIFICANT CHANGE UP (ref 8.4–10.5)
CHLORIDE BLDV-SCNC: 108 MMOL/L — SIGNIFICANT CHANGE UP (ref 96–108)
CHLORIDE SERPL-SCNC: 104 MMOL/L — SIGNIFICANT CHANGE UP (ref 98–107)
CO2 SERPL-SCNC: 20 MMOL/L — LOW (ref 22–31)
COLOR SPEC: YELLOW — SIGNIFICANT CHANGE UP
COVID-19 SPIKE DOMAIN AB INTERP: NEGATIVE — SIGNIFICANT CHANGE UP
COVID-19 SPIKE DOMAIN ANTIBODY RESULT: 0.4 U/ML — SIGNIFICANT CHANGE UP
CREAT SERPL-MCNC: 0.78 MG/DL — SIGNIFICANT CHANGE UP (ref 0.5–1.3)
DIFF PNL FLD: NEGATIVE — SIGNIFICANT CHANGE UP
EPI CELLS # UR: 4 /HPF — SIGNIFICANT CHANGE UP (ref 0–5)
ESTIMATED AVERAGE GLUCOSE: 126 MG/DL — HIGH (ref 68–114)
GAS PNL BLDV: 139 MMOL/L — SIGNIFICANT CHANGE UP (ref 136–146)
GLUCOSE BLDV-MCNC: 128 MG/DL — HIGH (ref 70–99)
GLUCOSE SERPL-MCNC: 116 MG/DL — HIGH (ref 70–99)
GLUCOSE UR QL: NEGATIVE — SIGNIFICANT CHANGE UP
HCO3 BLDV-SCNC: 24 MMOL/L — SIGNIFICANT CHANGE UP (ref 20–27)
HCT VFR BLDA CALC: 40 % — SIGNIFICANT CHANGE UP (ref 34.5–46.5)
HCV AB S/CO SERPL IA: 0.18 S/CO — SIGNIFICANT CHANGE UP (ref 0–0.99)
HCV AB SERPL-IMP: SIGNIFICANT CHANGE UP
HGB BLD CALC-MCNC: 13 G/DL — SIGNIFICANT CHANGE UP (ref 11.5–15.5)
HYALINE CASTS # UR AUTO: 11 /LPF — HIGH (ref 0–7)
KETONES UR-MCNC: NEGATIVE — SIGNIFICANT CHANGE UP
LACTATE BLDV-MCNC: 1.4 MMOL/L — SIGNIFICANT CHANGE UP (ref 0.5–2)
LEUKOCYTE ESTERASE UR-ACNC: NEGATIVE — SIGNIFICANT CHANGE UP
MAGNESIUM SERPL-MCNC: 2 MG/DL — SIGNIFICANT CHANGE UP (ref 1.6–2.6)
NITRITE UR-MCNC: NEGATIVE — SIGNIFICANT CHANGE UP
PCO2 BLDV: 38 MMHG — LOW (ref 41–51)
PH BLDV: 7.41 — SIGNIFICANT CHANGE UP (ref 7.32–7.43)
PH UR: 6 — SIGNIFICANT CHANGE UP (ref 5–8)
PHOSPHATE SERPL-MCNC: 3 MG/DL — SIGNIFICANT CHANGE UP (ref 2.5–4.5)
PO2 BLDV: 48 MMHG — HIGH (ref 35–40)
POTASSIUM BLDV-SCNC: 3.8 MMOL/L — SIGNIFICANT CHANGE UP (ref 3.4–4.5)
POTASSIUM SERPL-MCNC: 3.9 MMOL/L — SIGNIFICANT CHANGE UP (ref 3.5–5.3)
POTASSIUM SERPL-SCNC: 3.9 MMOL/L — SIGNIFICANT CHANGE UP (ref 3.5–5.3)
PROT UR-MCNC: ABNORMAL
RBC CASTS # UR COMP ASSIST: 1 /HPF — SIGNIFICANT CHANGE UP (ref 0–4)
SAO2 % BLDV: 84.1 % — SIGNIFICANT CHANGE UP (ref 60–85)
SARS-COV-2 IGG+IGM SERPL QL IA: 0.4 U/ML — SIGNIFICANT CHANGE UP
SARS-COV-2 IGG+IGM SERPL QL IA: NEGATIVE — SIGNIFICANT CHANGE UP
SODIUM SERPL-SCNC: 135 MMOL/L — SIGNIFICANT CHANGE UP (ref 135–145)
SP GR SPEC: 1.02 — SIGNIFICANT CHANGE UP (ref 1.01–1.02)
UROBILINOGEN FLD QL: SIGNIFICANT CHANGE UP
WBC UR QL: 7 /HPF — HIGH (ref 0–5)

## 2021-06-11 RX ORDER — CALAMINE AND ZINC OXIDE AND PHENOL 160; 10 MG/ML; MG/ML
1 LOTION TOPICAL THREE TIMES A DAY
Refills: 0 | Status: DISCONTINUED | OUTPATIENT
Start: 2021-06-11 | End: 2021-06-13

## 2021-06-11 RX ORDER — DIPHENHYDRAMINE HCL 50 MG
50 CAPSULE ORAL ONCE
Refills: 0 | Status: COMPLETED | OUTPATIENT
Start: 2021-06-11 | End: 2021-06-11

## 2021-06-11 RX ORDER — SODIUM CHLORIDE 9 MG/ML
500 INJECTION INTRAMUSCULAR; INTRAVENOUS; SUBCUTANEOUS ONCE
Refills: 0 | Status: COMPLETED | OUTPATIENT
Start: 2021-06-11 | End: 2021-06-11

## 2021-06-11 RX ORDER — SODIUM CHLORIDE 9 MG/ML
1000 INJECTION INTRAMUSCULAR; INTRAVENOUS; SUBCUTANEOUS
Refills: 0 | Status: DISCONTINUED | OUTPATIENT
Start: 2021-06-11 | End: 2021-06-13

## 2021-06-11 RX ADMIN — SODIUM CHLORIDE 500 MILLILITER(S): 9 INJECTION INTRAMUSCULAR; INTRAVENOUS; SUBCUTANEOUS at 06:44

## 2021-06-11 RX ADMIN — ENOXAPARIN SODIUM 40 MILLIGRAM(S): 100 INJECTION SUBCUTANEOUS at 20:44

## 2021-06-11 RX ADMIN — SODIUM CHLORIDE 75 MILLILITER(S): 9 INJECTION INTRAMUSCULAR; INTRAVENOUS; SUBCUTANEOUS at 20:44

## 2021-06-11 NOTE — PROGRESS NOTE ADULT - SUBJECTIVE AND OBJECTIVE BOX
Jose Little MD  Interventional Cardiology / Endovascular Specialist  [place] Office : [address] [place] [address] [place]. [address]  Tel:   [place] Office : [address] [facility] [place] [address]  Tel: [phone]  Cell : [phone]    Pt lying in bed , c/o Rt outer thigh itching and rash , had fever overnight   	  MEDICATIONS:  enoxaparin Injectable 40 milliGRAM(s) SubCutaneous daily  acetaminophen   Tablet .. 650 milliGRAM(s) Oral every 6 hours PRN      PAST MEDICAL/SURGICAL HISTORY  PAST MEDICAL & SURGICAL HISTORY:  No pertinent past medical history    Hyperlipidemia, unspecified hyperlipidemia type    No significant past surgical history        SOCIAL HISTORY: Substance Use (street drugs): ( x ) never used  (  ) other:    FAMILY HISTORY:  FHx: diabetes mellitus (Father, Mother)        REVIEW OF SYSTEMS:  CONSTITUTIONAL: No fever, weight loss, or fatigue  EYES: No eye pain, visual disturbances, or discharge  ENMT:  No difficulty hearing, tinnitus, vertigo; No sinus or throat pain  BREASTS: No pain, masses, or nipple discharge  GASTROINTESTINAL: No abdominal or epigastric pain. No nausea, vomiting, or hematemesis; No diarrhea or constipation. No melena or hematochezia.  GENITOURINARY: No dysuria, frequency, hematuria, or incontinence  NEUROLOGICAL: No headaches, memory loss, loss of strength, numbness, or tremors  ENDOCRINE: No heat or cold intolerance; No hair loss  MUSCULOSKELETAL: No joint pain or swelling; No muscle, back, or extremity pain  PSYCHIATRIC: No depression, anxiety, mood swings, or difficulty sleeping  HEME/LYMPH: No easy bruising, or bleeding gums  All others negative    PHYSICAL EXAM:  T(C): 36.9 (06-11-21 @ 11:36), Max: 38.1 (06-10-21 @ 21:05)  HR: 81 (06-11-21 @ 11:36) (42 - 84)  BP: 109/65 (06-11-21 @ 11:36) (95/54 - 126/74)  RR: 18 (06-11-21 @ 11:36) (18 - 18)  SpO2: 95% (06-11-21 @ 11:36) (95% - 100%)  Wt(kg): --  I&O's Summary    Height (cm): 167.6 (06-10 @ 20:21)  Weight (kg): 94 (06-10 @ 20:21)  BMI (kg/m2): 33.5 (06-10 @ 20:21)  BSA (m2): 2.03 (06-10 @ 20:21)    GENERAL: NAD, well-groomed, well-developed  EYES: EOMI, PERRLA, conjunctiva and sclera clear  ENMT: No tonsillar erythema, exudates, or enlargement; Moist mucous membranes, Good dentition, No lesions  Cardiovascular: Normal S1 S2, No JVD, No murmurs, No edema  Respiratory: Lungs clear to auscultation	  Gastrointestinal:  Soft, Non-tender, + BS	  Extremities: No edema, Rt thigh small blisters and redness and erythema                                     12.4   12.80 )-----------( 277      ( 10 Kobi 2021 07:05 )             38.0     06-11    135  |  104  |  11  ----------------------------<  116<H>  3.9   |  20<L>  |  0.78    Ca    8.7      11 Jun 2021 02:27  Phos  3.0     06-11  Mg     2.0     06-11    TPro  6.7  /  Alb  3.6  /  TBili  0.3  /  DBili  x   /  AST  25  /  ALT  26  /  AlkPhos  80  06-10    proBNP:   Lipid Profile:   HgA1c:   TSH:     Consultant(s) Notes Reviewed:  [x ] YES  [ ] NO    Care Discussed with Consultants/Other Providers [ x] YES  [ ] NO    Imaging Personally Reviewed independently:  [x] YES  [ ] NO    All labs, radiologic studies, vitals, orders and medications list reviewed. Patient is seen and examined at bedside. Case discussed with medical team.

## 2021-06-12 RX ADMIN — ENOXAPARIN SODIUM 40 MILLIGRAM(S): 100 INJECTION SUBCUTANEOUS at 20:38

## 2021-06-12 RX ADMIN — Medication 1 APPLICATION(S): at 20:38

## 2021-06-12 NOTE — PROGRESS NOTE ADULT - SUBJECTIVE AND OBJECTIVE BOX
Jose Little MD  Interventional Cardiology / Endovascular Specialist  [place] Office : [address] [place] [address] [place]. [address]  Tel:   [place] Office : [address] [facility] [place] [address]  Tel: [phone]  Cell : [phone]    Pt lying in bed , c/o Rt outer thigh itching and rash , had fever overnight   	  MEDICATIONS:  enoxaparin Injectable 40 milliGRAM(s) SubCutaneous daily        acetaminophen   Tablet .. 650 milliGRAM(s) Oral every 6 hours PRN        calamine/zinc oxide Lotion 1 Application(s) Topical three times a day PRN  sodium chloride 0.9%. 1000 milliLiter(s) IV Continuous <Continuous>      PAST MEDICAL/SURGICAL HISTORY  PAST MEDICAL & SURGICAL HISTORY:  No pertinent past medical history    Hyperlipidemia, unspecified hyperlipidemia type    No significant past surgical history        SOCIAL HISTORY: Substance Use (street drugs): ( x ) never used  (  ) other:    FAMILY HISTORY:  FHx: diabetes mellitus (Father, Mother)        REVIEW OF SYSTEMS:  CONSTITUTIONAL: No fever, weight loss, or fatigue  EYES: No eye pain, visual disturbances, or discharge  ENMT:  No difficulty hearing, tinnitus, vertigo; No sinus or throat pain  BREASTS: No pain, masses, or nipple discharge  GASTROINTESTINAL: No abdominal or epigastric pain. No nausea, vomiting, or hematemesis; No diarrhea or constipation. No melena or hematochezia.  GENITOURINARY: No dysuria, frequency, hematuria, or incontinence  NEUROLOGICAL: No headaches, memory loss, loss of strength, numbness, or tremors  ENDOCRINE: No heat or cold intolerance; No hair loss  MUSCULOSKELETAL: No joint pain or swelling; No muscle, back, or extremity pain  PSYCHIATRIC: No depression, anxiety, mood swings, or difficulty sleeping  HEME/LYMPH: No easy bruising, or bleeding gums  All others negative    PHYSICAL EXAM:  T(C): 36.7 (06-12-21 @ 08:00), Max: 37.1 (06-11-21 @ 19:36)  HR: 62 (06-12-21 @ 08:00) (62 - 87)  BP: 132/78 (06-12-21 @ 08:00) (95/68 - 132/78)  RR: 18 (06-12-21 @ 08:00) (18 - 18)  SpO2: 100% (06-12-21 @ 08:00) (95% - 100%)  Wt(kg): --  I&O's Summary      GENERAL: NAD, well-groomed, well-developed  EYES: EOMI, PERRLA, conjunctiva and sclera clear  ENMT: No tonsillar erythema, exudates, or enlargement; Moist mucous membranes, Good dentition, No lesions  Cardiovascular: Normal S1 S2, No JVD, No murmurs, No edema  Respiratory: Lungs clear to auscultation	  Gastrointestinal:  Soft, Non-tender, + BS	  Extremities: No edema, Rt thigh small blisters and redness and erythema               06-11    135  |  104  |  11  ----------------------------<  116<H>  3.9   |  20<L>  |  0.78    Ca    8.7      11 Jun 2021 02:27  Phos  3.0     06-11  Mg     2.0     06-11      proBNP:   Lipid Profile:   HgA1c:   TSH:     Consultant(s) Notes Reviewed:  [x ] YES  [ ] NO    Care Discussed with Consultants/Other Providers [ x] YES  [ ] NO    Imaging Personally Reviewed independently:  [x] YES  [ ] NO    All labs, radiologic studies, vitals, orders and medications list reviewed. Patient is seen and examined at bedside. Case discussed with medical team.

## 2021-06-13 ENCOUNTER — TRANSCRIPTION ENCOUNTER (OUTPATIENT)
Age: 61
End: 2021-06-13

## 2021-06-13 VITALS
SYSTOLIC BLOOD PRESSURE: 128 MMHG | OXYGEN SATURATION: 100 % | RESPIRATION RATE: 18 BRPM | TEMPERATURE: 98 F | HEART RATE: 65 BPM | DIASTOLIC BLOOD PRESSURE: 85 MMHG

## 2021-06-13 LAB
ALBUMIN SERPL ELPH-MCNC: 3.3 G/DL — SIGNIFICANT CHANGE UP (ref 3.3–5)
ALP SERPL-CCNC: 66 U/L — SIGNIFICANT CHANGE UP (ref 40–120)
ALT FLD-CCNC: 27 U/L — SIGNIFICANT CHANGE UP (ref 4–33)
ANION GAP SERPL CALC-SCNC: 13 MMOL/L — SIGNIFICANT CHANGE UP (ref 7–14)
AST SERPL-CCNC: 19 U/L — SIGNIFICANT CHANGE UP (ref 4–32)
BASOPHILS # BLD AUTO: 0.02 K/UL — SIGNIFICANT CHANGE UP (ref 0–0.2)
BASOPHILS NFR BLD AUTO: 0.2 % — SIGNIFICANT CHANGE UP (ref 0–2)
BILIRUB SERPL-MCNC: 0.2 MG/DL — SIGNIFICANT CHANGE UP (ref 0.2–1.2)
BUN SERPL-MCNC: 13 MG/DL — SIGNIFICANT CHANGE UP (ref 7–23)
CALCIUM SERPL-MCNC: 8.9 MG/DL — SIGNIFICANT CHANGE UP (ref 8.4–10.5)
CHLORIDE SERPL-SCNC: 108 MMOL/L — HIGH (ref 98–107)
CO2 SERPL-SCNC: 20 MMOL/L — LOW (ref 22–31)
CREAT SERPL-MCNC: 0.83 MG/DL — SIGNIFICANT CHANGE UP (ref 0.5–1.3)
CULTURE RESULTS: SIGNIFICANT CHANGE UP
EOSINOPHIL # BLD AUTO: 0.88 K/UL — HIGH (ref 0–0.5)
EOSINOPHIL NFR BLD AUTO: 10.1 % — HIGH (ref 0–6)
GLUCOSE SERPL-MCNC: 96 MG/DL — SIGNIFICANT CHANGE UP (ref 70–99)
HCT VFR BLD CALC: 40.6 % — SIGNIFICANT CHANGE UP (ref 34.5–45)
HGB BLD-MCNC: 13.3 G/DL — SIGNIFICANT CHANGE UP (ref 11.5–15.5)
IANC: 2.97 K/UL — SIGNIFICANT CHANGE UP (ref 1.5–8.5)
IMM GRANULOCYTES NFR BLD AUTO: 0.1 % — SIGNIFICANT CHANGE UP (ref 0–1.5)
LYMPHOCYTES # BLD AUTO: 4.35 K/UL — HIGH (ref 1–3.3)
LYMPHOCYTES # BLD AUTO: 50.1 % — HIGH (ref 13–44)
MAGNESIUM SERPL-MCNC: 2.1 MG/DL — SIGNIFICANT CHANGE UP (ref 1.6–2.6)
MCHC RBC-ENTMCNC: 28.9 PG — SIGNIFICANT CHANGE UP (ref 27–34)
MCHC RBC-ENTMCNC: 32.8 GM/DL — SIGNIFICANT CHANGE UP (ref 32–36)
MCV RBC AUTO: 88.1 FL — SIGNIFICANT CHANGE UP (ref 80–100)
MONOCYTES # BLD AUTO: 0.45 K/UL — SIGNIFICANT CHANGE UP (ref 0–0.9)
MONOCYTES NFR BLD AUTO: 5.2 % — SIGNIFICANT CHANGE UP (ref 2–14)
NEUTROPHILS # BLD AUTO: 2.97 K/UL — SIGNIFICANT CHANGE UP (ref 1.8–7.4)
NEUTROPHILS NFR BLD AUTO: 34.3 % — LOW (ref 43–77)
NRBC # BLD: 0 /100 WBCS — SIGNIFICANT CHANGE UP
NRBC # FLD: 0 K/UL — SIGNIFICANT CHANGE UP
PHOSPHATE SERPL-MCNC: 3.7 MG/DL — SIGNIFICANT CHANGE UP (ref 2.5–4.5)
PLATELET # BLD AUTO: 275 K/UL — SIGNIFICANT CHANGE UP (ref 150–400)
POTASSIUM SERPL-MCNC: 4.6 MMOL/L — SIGNIFICANT CHANGE UP (ref 3.5–5.3)
POTASSIUM SERPL-SCNC: 4.6 MMOL/L — SIGNIFICANT CHANGE UP (ref 3.5–5.3)
PROT SERPL-MCNC: 6.9 G/DL — SIGNIFICANT CHANGE UP (ref 6–8.3)
RBC # BLD: 4.61 M/UL — SIGNIFICANT CHANGE UP (ref 3.8–5.2)
RBC # FLD: 14 % — SIGNIFICANT CHANGE UP (ref 10.3–14.5)
SODIUM SERPL-SCNC: 141 MMOL/L — SIGNIFICANT CHANGE UP (ref 135–145)
SPECIMEN SOURCE: SIGNIFICANT CHANGE UP
WBC # BLD: 8.68 K/UL — SIGNIFICANT CHANGE UP (ref 3.8–10.5)
WBC # FLD AUTO: 8.68 K/UL — SIGNIFICANT CHANGE UP (ref 3.8–10.5)

## 2021-06-13 PROCEDURE — 78452 HT MUSCLE IMAGE SPECT MULT: CPT | Mod: 26

## 2021-06-13 PROCEDURE — 93018 CV STRESS TEST I&R ONLY: CPT | Mod: GC

## 2021-06-13 PROCEDURE — 93016 CV STRESS TEST SUPVJ ONLY: CPT | Mod: GC

## 2021-06-13 RX ORDER — CALAMINE AND ZINC OXIDE AND PHENOL 160; 10 MG/ML; MG/ML
1 LOTION TOPICAL
Qty: 0 | Refills: 0 | DISCHARGE
Start: 2021-06-13

## 2021-06-13 RX ORDER — ACETAMINOPHEN 500 MG
2 TABLET ORAL
Qty: 0 | Refills: 0 | DISCHARGE
Start: 2021-06-13

## 2021-06-13 RX ADMIN — Medication 1 APPLICATION(S): at 05:59

## 2021-06-13 RX ADMIN — Medication 1 APPLICATION(S): at 17:10

## 2021-06-13 NOTE — PROGRESS NOTE ADULT - ASSESSMENT
59 y/o F with no PMH presents with chest tightness. Patient states that she was in her usual state of health until yesterday.    EKG: NSR no STT aimee  Tele: NSR 70s    1. Chest pain  -trops 8--7  -CTA negative for PE or AAA  -as per patient had echo done yesterday at Dr. Ray office ([phone]) will try to obtain results    2. Syncope  -after nitro while with EMS  -bradycardic and hypotensive in ER, improved s/p atropine and IVF now in NSR 70s  -continue to monitor on tele  -echo pending    3. Fever /rash   - pan culture   - give Benadryl 50 mg IV x 1   - of rash gets worse will get Derm consult      DVT prophylaxis  -hep subq
61 y/o F with no PMH presents with chest tightness. Patient states that she was in her usual state of health until yesterday.    EKG: NSR no STT aimee  Tele: NSR 70s    1. Chest pain  -trops 8--7  -CTA negative for PE or AAA  -as per patient had echo done yesterday at Dr. Ray office ([phone]) will try to obtain results    2. Syncope  -after nitro while with EMS  -bradycardic and hypotensive in ER, improved s/p atropine and IVF now in NSR 70s  -continue to monitor on tele  -echo pending    3. Fever /rash   - pan culture   - give Benadryl 50 mg IV x 1   - of rash gets worse will get Derm consult      DVT prophylaxis  -hep subq
61 y/o F with no PMH presents with chest tightness. Patient states that she was in her usual state of health until yesterday.    EKG: NSR no STT chnages  Tele: NSR 70s    1. Chest pain  -trops 8--7  -CTA negative for PE or AAA  -stress pending     2. Syncope  -after nitro while with EMS  -bradycardic and hypotensive in ER, improved s/p atropine and IVF now in NSR 70s  -continue to monitor on tele  -echo normal     3. Fever /rash   - pan culture   - give Benadryl 50 mg IV x 1   - of rash gets worse will get Derm consult      DVT prophylaxis  -hep subq

## 2021-06-13 NOTE — DISCHARGE NOTE PROVIDER - PROVIDER TOKENS
PROVIDER:[TOKEN:[[id number]],FOLLOWUP:[1 week],ESTABLISHEDPATIENT:[T]],PROVIDER:[TOKEN:[[id number]],FOLLOWUP:[1 week],ESTABLISHEDPATIENT:[T]]

## 2021-06-13 NOTE — PROGRESS NOTE ADULT - SUBJECTIVE AND OBJECTIVE BOX
Jose Little MD  Interventional Cardiology / Endovascular Specialist  [place] Office : [address] [place] [address] [place]. [address]  Tel:   [place] Office : [address] [facility] [place] [address]  Tel: [phone]  Cell : [phone]    Pt lying in bed , c/o Rt outer thigh itching and rash , had fever overnight   	  MEDICATIONS:  enoxaparin Injectable 40 milliGRAM(s) SubCutaneous daily        acetaminophen   Tablet .. 650 milliGRAM(s) Oral every 6 hours PRN        calamine/zinc oxide Lotion 1 Application(s) Topical three times a day PRN  clobetasol 0.05% Cream 1 Application(s) Topical two times a day  sodium chloride 0.9%. 1000 milliLiter(s) IV Continuous <Continuous>      PAST MEDICAL/SURGICAL HISTORY  PAST MEDICAL & SURGICAL HISTORY:  No pertinent past medical history    Hyperlipidemia, unspecified hyperlipidemia type    No significant past surgical history        SOCIAL HISTORY: Substance Use (street drugs): ( x ) never used  (  ) other:    FAMILY HISTORY:  FHx: diabetes mellitus (Father, Mother)        REVIEW OF SYSTEMS:  CONSTITUTIONAL: No fever, weight loss, or fatigue  EYES: No eye pain, visual disturbances, or discharge  ENMT:  No difficulty hearing, tinnitus, vertigo; No sinus or throat pain  BREASTS: No pain, masses, or nipple discharge  GASTROINTESTINAL: No abdominal or epigastric pain. No nausea, vomiting, or hematemesis; No diarrhea or constipation. No melena or hematochezia.  GENITOURINARY: No dysuria, frequency, hematuria, or incontinence  NEUROLOGICAL: No headaches, memory loss, loss of strength, numbness, or tremors  ENDOCRINE: No heat or cold intolerance; No hair loss  MUSCULOSKELETAL: No joint pain or swelling; No muscle, back, or extremity pain  PSYCHIATRIC: No depression, anxiety, mood swings, or difficulty sleeping  HEME/LYMPH: No easy bruising, or bleeding gums  All others negative    PHYSICAL EXAM:  T(C): 36.8 (06-13-21 @ 05:58), Max: 36.9 (06-12-21 @ 20:45)  HR: 65 (06-13-21 @ 05:58) (65 - 76)  BP: 128/85 (06-13-21 @ 05:58) (128/80 - 128/85)  RR: 18 (06-13-21 @ 05:58) (18 - 18)  SpO2: 100% (06-13-21 @ 05:58) (99% - 100%)  Wt(kg): --  I&O's Summary      GENERAL: NAD, well-groomed, well-developed  EYES: EOMI, PERRLA, conjunctiva and sclera clear  ENMT: No tonsillar erythema, exudates, or enlargement; Moist mucous membranes, Good dentition, No lesions  Cardiovascular: Normal S1 S2, No JVD, No murmurs, No edema  Respiratory: Lungs clear to auscultation	  Gastrointestinal:  Soft, Non-tender, + BS	  Extremities: No edema, Rt thigh small blisters and redness and erythema                                   13.3   8.68  )-----------( 275      ( 13 Jun 2021 08:28 )             40.6     06-13    141  |  108<H>  |  13  ----------------------------<  96  4.6   |  20<L>  |  0.83    Ca    8.9      13 Jun 2021 08:28  Phos  3.7     06-13  Mg     2.1     06-13    TPro  6.9  /  Alb  3.3  /  TBili  0.2  /  DBili  x   /  AST  19  /  ALT  27  /  AlkPhos  66  06-13    proBNP:   Lipid Profile:   HgA1c:   TSH:     Consultant(s) Notes Reviewed:  [x ] YES  [ ] NO    Care Discussed with Consultants/Other Providers [ x] YES  [ ] NO    Imaging Personally Reviewed independently:  [x] YES  [ ] NO    All labs, radiologic studies, vitals, orders and medications list reviewed. Patient is seen and examined at bedside. Case discussed with medical team.

## 2021-06-13 NOTE — DISCHARGE NOTE PROVIDER - NSDCCPCAREPLAN_GEN_ALL_CORE_FT
PRINCIPAL DISCHARGE DIAGNOSIS  Diagnosis: Bradycardia  Assessment and Plan of Treatment: This resolved, and was likely due to sl Nitro that was given prior to arrival to the hospital. Your heart rate has been in a normal rate and rhythm      SECONDARY DISCHARGE DIAGNOSES  Diagnosis: Syncope  Assessment and Plan of Treatment: Your syncope resolved and blood pressure was normal .  Upon discharge from the hospital please be sure to eat and drink adequately to maintain a stable blood pressure. Follow-up with your primary care provider as outpatient within 1-2 weeks to further monitor your blood-work and blood pressure. Maintain a safe environment and avoid standing up too quickly in order to prevent falls. Be aware of presistent dizziness, nausea/vomiting, fainting, changes in heart rate/blood pressure, and similar events, and return to emergency department if such signs/symptoms persist.      Diagnosis: Chest pain, unspecified type  Assessment and Plan of Treatment: You had a normal echocardiogram and normal stress test. Yous chest pain resolved  and are medically cleared to go home. Please follow up with your primary care physician regarding your hospitalization      Diagnosis: Skin rash  Assessment and Plan of Treatment: Continue your medication clobetasol, and calamine lotion as directed. Continue with medication you were sent home with and please follow-up as an outpatient with your primary care provider for further care and recommendations.       [Negative] : Endocrine PRINCIPAL DISCHARGE DIAGNOSIS  Diagnosis: Chest pain, unspecified type  Assessment and Plan of Treatment: You had CT chest negative for any blood clots, or aneurysms. You had CT scan of the Abdomen negative for  hematoma, negative for diissection or aneurysmal dilatation . You had a normal echocardiogram and normal stress test. Your chest pain resolved  and are medically cleared to go home. Please follow up with your primary care physician regarding your hospitalization        SECONDARY DISCHARGE DIAGNOSES  Diagnosis: Syncope  Assessment and Plan of Treatment: Your syncope resolved and blood pressure was normal .  Upon discharge from the hospital please be sure to eat and drink adequately to maintain a stable blood pressure. Follow-up with your primary care provider as outpatient within 1-2 weeks to further monitor your blood-work and blood pressure. Maintain a safe environment and avoid standing up too quickly in order to prevent falls. Be aware of presistent dizziness, nausea/vomiting, fainting, changes in heart rate/blood pressure, and similar events, and return to emergency department if such signs/symptoms persist.      Diagnosis: Bradycardia  Assessment and Plan of Treatment: This resolved, and was likely due to sl Nitro that was given prior to arrival to the hospital. Your heart rate has been in a normal rate and rhythm    Diagnosis: Skin rash  Assessment and Plan of Treatment: Continue your medication clobetasol, and calamine lotion as directed. Continue with medication you were sent home with and please follow-up as an outpatient with your primary care provider for further care and recommendations.      Diagnosis: Chest pain, unspecified type  Assessment and Plan of Treatment: You had a normal echocardiogram and normal stress test. Yous chest pain resolved  and are medically cleared to go home. Please follow up with your primary care physician regarding your hospitalization

## 2021-06-13 NOTE — DISCHARGE NOTE PROVIDER - HOSPITAL COURSE
59 y/o F no PMH admitted with chest tightness. Course complicated in ambulance after nitro with bradycardia. Bradycardia resolved in ED after atropine.      Problem/Plan - 1:  ·  Problem: Chest pain, unspecified type.  Plan: -Chest tightness x 1 day  -Trops negative x2  -EKG non ischemic  -Check TTE  -Cards to follow as primary team  -Monitor on tele  -S/p asa load.      Problem/Plan - 2:  ·  Problem: Syncope.  Plan: -episode of witnessed syncope in ambulance after nitro administration  -suspect vasovagal episode  -Monitor on tele  -check TTE.      Problem/Plan - 3:  ·  Problem: Bradycardia.  Plan: -likely s/p nitro administration  -resolved s/p atropine in ED  -NSR now  -monitor on tele  -avoid AV leatha blocking agents.      Problem/Plan - 4:  ·  Problem: Hyperlipidemia, unspecified hyperlipidemia type.  Plan: -was told by PCP yesterday that cholesterol slightly elevated  -not started on meds, to be monitored as outpt. F/u scheduled in 3 months.      Problem/Plan - 5:  ·  Problem: DVT prophylaxis.  Plan: -lovenox.      61 y/o F no PMH admitted with chest tightness. Course complicated in ambulance after nitro with bradycardia. Bradycardia resolved in ED after atropine.      Chest pain, unspecified type.  Plan: -Chest tightness x 1 day  -Trops negative x2  -Cards to follow as primary team  -EKG non ischemic  -TTE normal  -NST normal study  -Monitor on tele  -S/p asa load.     Syncope.    Plan: -episode of witnessed syncope in ambulance after nitro administration  -suspect vasovagal episode  -orthostatic BP negatives   -Monitor on tele  -TTE normal    Bradycardia.    Plan: -likely s/p nitro administration  -resolved s/p atropine in ED  -NSR now  -monitor on tele  -avoid AV leatha blocking agents.   -Currently NSR    Hyperlipidemia, unspecified hyperlipidemia type.    Plan: -was told by PCP yesterday that cholesterol slightly elevated  -not started on meds, to be monitored as outpt. F/u scheduled in 3 months.     Patient seen and evaluated. Afebrile, VSS. Tolerating diet. Denies any chest pain, palpitations, dizziness, shortness of breath, N/V, or any other c/o's or new concerns. Rash to b/l posterior thighs improving with clobetasol cream. Patient case reviewed and discussed with Attending Physician Dr Little who agrees the patient is medically stable for discharge 6/13, with outpatient follow with Primary Care Physician Dr Ray, and with Cardiologist Dr Littel in 1 week for ongoing medical management. All medications reviewed and reconciled .Reviewed discharge medications with patient and attending. All questions and concerns addressed and answered to patient's satisfaction. Patient understands and agrees with plan of care.     61 y/o F no PMH admitted with chest tightness. Course complicated in ambulance after nitro with bradycardia. Bradycardia resolved in ED after atropine.      Chest pain, unspecified type.    Plan: -Chest tightness x 1 day  -Trops negative x2  -CTA Chest neg for PE,  No trauma and hematoma, aortic dissection or aneurysmal dilatation.  -Cards to follow as primary team  -EKG non ischemic  -TTE normal  -NST normal study  -Monitor on tele  -S/p asa load.     Syncope.    Plan: -episode of witnessed syncope in ambulance after nitro administration  -suspect vasovagal episode  -orthostatic BP negatives   -Monitor on tele  -TTE normal    Bradycardia.    Plan: -likely s/p nitro administration  -resolved s/p atropine in ED  -NSR now  -monitor on tele  -avoid AV leatha blocking agents.   -Currently NSR    Hyperlipidemia, unspecified hyperlipidemia type.    Plan: -was told by PCP yesterday that cholesterol slightly elevated  -not started on meds, to be monitored as outpt. F/u scheduled in 3 months.     Patient seen and evaluated. Afebrile, VSS. Tolerating diet. Denies any chest pain, palpitations, dizziness, shortness of breath, N/V, or any other c/o's or new concerns. Rash to b/l posterior thighs improving with clobetasol cream. Patient case reviewed and discussed with Attending Physician Dr Little who agrees the patient is medically stable for discharge 6/13, with outpatient follow with Primary Care Physician Dr Ray, and with Cardiologist Dr Little in 1 week for ongoing medical management. All medications reviewed and reconciled .Reviewed discharge medications with patient and attending. All questions and concerns addressed and answered to patient's satisfaction. Patient understands and agrees with plan of care.

## 2021-06-13 NOTE — DISCHARGE NOTE PROVIDER - NSDCMRMEDTOKEN_GEN_ALL_CORE_FT
acetaminophen 325 mg oral tablet: 2 tab(s) orally every 6 hours, As needed, Temp greater or equal to 38C (100.4F), Mild Pain (1 - 3)  calamine topical lotion: 1 application topically 3 times a day, As needed, Rash and/or Itching  clobetasol 0.05% topical cream: 1 application topically 2 times a day for 3 more days

## 2021-06-13 NOTE — DISCHARGE NOTE NURSING/CASE MANAGEMENT/SOCIAL WORK - PATIENT PORTAL LINK FT
You can access the FollowMyHealth Patient Portal offered by [facility] by registering at the following website: [email or web address]. By joining [facility]’s FollowMyHealth portal, you will also be able to view your health information using other applications (apps) compatible with our system.

## 2021-06-16 LAB
CULTURE RESULTS: SIGNIFICANT CHANGE UP
CULTURE RESULTS: SIGNIFICANT CHANGE UP
SPECIMEN SOURCE: SIGNIFICANT CHANGE UP
SPECIMEN SOURCE: SIGNIFICANT CHANGE UP

## 2021-07-12 NOTE — DISCHARGE NOTE PROVIDER - CARE PROVIDER_API CALL
wide complex/sinus bradycardia
VITOR BUNCH  Internal Medicine  [address] [place] [address]  Phone: [phone]  Fax: [phone]  Established Patient  Follow Up Time: 1 week    Jose Little)  Cardiovascular Disease; Internal Medicine  [address] [place] [address]  Phone: [phone]  Fax: [phone]  Established Patient  Follow Up Time: 1 week

## 2023-10-23 NOTE — ED PROVIDER NOTE - NORMAL, MLM
yin all pertinent systems normal Otezla Counseling: The side effects of Otezla were discussed with the patient, including but not limited to worsening or new depression, weight loss, diarrhea, nausea, upper respiratory tract infection, and headache. Patient instructed to call the office should any adverse effect occur.  The patient verbalized understanding of the proper use and possible adverse effects of Otezla.  All the patient's questions and concerns were addressed.

## 2025-03-27 ENCOUNTER — NON-APPOINTMENT (OUTPATIENT)
Age: 65
End: 2025-03-27

## 2025-03-27 ENCOUNTER — APPOINTMENT (OUTPATIENT)
Age: 65
End: 2025-03-27
Payer: COMMERCIAL

## 2025-03-27 PROCEDURE — 92134 CPTRZ OPH DX IMG PST SGM RTA: CPT

## 2025-03-27 PROCEDURE — 92002 INTRM OPH EXAM NEW PATIENT: CPT
